# Patient Record
Sex: FEMALE | Race: WHITE | NOT HISPANIC OR LATINO | Employment: PART TIME | URBAN - NONMETROPOLITAN AREA
[De-identification: names, ages, dates, MRNs, and addresses within clinical notes are randomized per-mention and may not be internally consistent; named-entity substitution may affect disease eponyms.]

---

## 2021-03-05 ENCOUNTER — IMMUNIZATION (OUTPATIENT)
Dept: VACCINE CLINIC | Facility: HOSPITAL | Age: 26
End: 2021-03-05

## 2021-03-05 PROCEDURE — 91300 HC SARSCOV02 VAC 30MCG/0.3ML IM: CPT | Performed by: INTERNAL MEDICINE

## 2021-03-05 PROCEDURE — 0001A: CPT | Performed by: INTERNAL MEDICINE

## 2021-03-26 ENCOUNTER — IMMUNIZATION (OUTPATIENT)
Dept: VACCINE CLINIC | Facility: HOSPITAL | Age: 26
End: 2021-03-26

## 2021-03-26 PROCEDURE — 0002A: CPT | Performed by: INTERNAL MEDICINE

## 2021-03-26 PROCEDURE — 91300 HC SARSCOV02 VAC 30MCG/0.3ML IM: CPT | Performed by: INTERNAL MEDICINE

## 2025-02-04 ENCOUNTER — HOSPITAL ENCOUNTER (EMERGENCY)
Facility: HOSPITAL | Age: 30
Discharge: HOME | End: 2025-02-05
Attending: EMERGENCY MEDICINE
Payer: COMMERCIAL

## 2025-02-04 ENCOUNTER — ANESTHESIA (OUTPATIENT)
Dept: PERIOP | Facility: HOSPITAL | Age: 30
End: 2025-02-04
Payer: COMMERCIAL

## 2025-02-04 ENCOUNTER — ANESTHESIA EVENT (OUTPATIENT)
Dept: PERIOP | Facility: HOSPITAL | Age: 30
End: 2025-02-04
Payer: COMMERCIAL

## 2025-02-04 ENCOUNTER — APPOINTMENT (OUTPATIENT)
Dept: CT IMAGING | Facility: HOSPITAL | Age: 30
End: 2025-02-04
Payer: COMMERCIAL

## 2025-02-04 DIAGNOSIS — K37 APPENDICITIS: ICD-10-CM

## 2025-02-04 DIAGNOSIS — K35.30 ACUTE APPENDICITIS WITH LOCALIZED PERITONITIS, WITHOUT PERFORATION, ABSCESS, OR GANGRENE: Primary | ICD-10-CM

## 2025-02-04 PROBLEM — F31.12 BIPOLAR 1 DISORDER, MANIC, MODERATE: Status: ACTIVE | Noted: 2022-03-28

## 2025-02-04 PROBLEM — F41.1 GENERALIZED ANXIETY DISORDER: Status: ACTIVE | Noted: 2022-03-28

## 2025-02-04 LAB
ALBUMIN SERPL-MCNC: 5.2 G/DL (ref 3.5–5.2)
ALBUMIN/GLOB SERPL: 1.7 G/DL
ALP SERPL-CCNC: 72 U/L (ref 39–117)
ALT SERPL W P-5'-P-CCNC: 18 U/L (ref 1–33)
ANION GAP SERPL CALCULATED.3IONS-SCNC: 12.6 MMOL/L (ref 5–15)
AST SERPL-CCNC: 24 U/L (ref 1–32)
B-HCG UR QL: NEGATIVE
BASOPHILS # BLD AUTO: 0.03 10*3/MM3 (ref 0–0.2)
BASOPHILS NFR BLD AUTO: 0.2 % (ref 0–1.5)
BILIRUB SERPL-MCNC: 0.5 MG/DL (ref 0–1.2)
BILIRUB UR QL STRIP: NEGATIVE
BUN SERPL-MCNC: 11 MG/DL (ref 6–20)
BUN/CREAT SERPL: 13.9 (ref 7–25)
CALCIUM SPEC-SCNC: 9.8 MG/DL (ref 8.6–10.5)
CHLORIDE SERPL-SCNC: 101 MMOL/L (ref 98–107)
CLARITY UR: ABNORMAL
CO2 SERPL-SCNC: 25.4 MMOL/L (ref 22–29)
COLOR UR: YELLOW
CREAT SERPL-MCNC: 0.79 MG/DL (ref 0.57–1)
DEPRECATED RDW RBC AUTO: 38.9 FL (ref 37–54)
EGFRCR SERPLBLD CKD-EPI 2021: 105.3 ML/MIN/1.73
EOSINOPHIL # BLD AUTO: 0.05 10*3/MM3 (ref 0–0.4)
EOSINOPHIL NFR BLD AUTO: 0.3 % (ref 0.3–6.2)
ERYTHROCYTE [DISTWIDTH] IN BLOOD BY AUTOMATED COUNT: 11.9 % (ref 12.3–15.4)
FLUAV RNA RESP QL NAA+PROBE: NOT DETECTED
FLUBV RNA RESP QL NAA+PROBE: NOT DETECTED
GLOBULIN UR ELPH-MCNC: 3.1 GM/DL
GLUCOSE SERPL-MCNC: 106 MG/DL (ref 65–99)
GLUCOSE UR STRIP-MCNC: NEGATIVE MG/DL
HCT VFR BLD AUTO: 41.3 % (ref 34–46.6)
HGB BLD-MCNC: 14 G/DL (ref 12–15.9)
HGB UR QL STRIP.AUTO: NEGATIVE
IMM GRANULOCYTES # BLD AUTO: 0.09 10*3/MM3 (ref 0–0.05)
IMM GRANULOCYTES NFR BLD AUTO: 0.5 % (ref 0–0.5)
KETONES UR QL STRIP: ABNORMAL
LEUKOCYTE ESTERASE UR QL STRIP.AUTO: NEGATIVE
LIPASE SERPL-CCNC: 32 U/L (ref 13–60)
LYMPHOCYTES # BLD AUTO: 1.98 10*3/MM3 (ref 0.7–3.1)
LYMPHOCYTES NFR BLD AUTO: 11.4 % (ref 19.6–45.3)
MAGNESIUM SERPL-MCNC: 2 MG/DL (ref 1.6–2.6)
MCH RBC QN AUTO: 30.4 PG (ref 26.6–33)
MCHC RBC AUTO-ENTMCNC: 33.9 G/DL (ref 31.5–35.7)
MCV RBC AUTO: 89.6 FL (ref 79–97)
MONOCYTES # BLD AUTO: 0.78 10*3/MM3 (ref 0.1–0.9)
MONOCYTES NFR BLD AUTO: 4.5 % (ref 5–12)
NEUTROPHILS NFR BLD AUTO: 14.5 10*3/MM3 (ref 1.7–7)
NEUTROPHILS NFR BLD AUTO: 83.1 % (ref 42.7–76)
NITRITE UR QL STRIP: NEGATIVE
NRBC BLD AUTO-RTO: 0 /100 WBC (ref 0–0.2)
PH UR STRIP.AUTO: 6 [PH] (ref 5–8)
PLATELET # BLD AUTO: 279 10*3/MM3 (ref 140–450)
PMV BLD AUTO: 9.8 FL (ref 6–12)
POTASSIUM SERPL-SCNC: 3.9 MMOL/L (ref 3.5–5.2)
PROT SERPL-MCNC: 8.3 G/DL (ref 6–8.5)
PROT UR QL STRIP: NEGATIVE
RBC # BLD AUTO: 4.61 10*6/MM3 (ref 3.77–5.28)
SARS-COV-2 RNA RESP QL NAA+PROBE: NOT DETECTED
SODIUM SERPL-SCNC: 139 MMOL/L (ref 136–145)
SP GR UR STRIP: 1.02 (ref 1–1.03)
UROBILINOGEN UR QL STRIP: ABNORMAL
WBC NRBC COR # BLD AUTO: 17.43 10*3/MM3 (ref 3.4–10.8)

## 2025-02-04 PROCEDURE — 25010000002 SUCCINYLCHOLINE PER 20 MG: Performed by: NURSE ANESTHETIST, CERTIFIED REGISTERED

## 2025-02-04 PROCEDURE — 25010000002 PIPERACILLIN SOD-TAZOBACTAM PER 1 G: Performed by: EMERGENCY MEDICINE

## 2025-02-04 PROCEDURE — 81003 URINALYSIS AUTO W/O SCOPE: CPT | Performed by: EMERGENCY MEDICINE

## 2025-02-04 PROCEDURE — 25010000002 MEPERIDINE PER 100 MG: Performed by: NURSE ANESTHETIST, CERTIFIED REGISTERED

## 2025-02-04 PROCEDURE — 25010000002 HEPARIN (PORCINE) PER 1000 UNITS: Performed by: SURGERY

## 2025-02-04 PROCEDURE — 81025 URINE PREGNANCY TEST: CPT | Performed by: EMERGENCY MEDICINE

## 2025-02-04 PROCEDURE — 87636 SARSCOV2 & INF A&B AMP PRB: CPT | Performed by: EMERGENCY MEDICINE

## 2025-02-04 PROCEDURE — 25010000002 BUPIVACAINE (PF) 0.25 % SOLUTION: Performed by: SURGERY

## 2025-02-04 PROCEDURE — 25010000002 ONDANSETRON PER 1 MG: Performed by: NURSE ANESTHETIST, CERTIFIED REGISTERED

## 2025-02-04 PROCEDURE — 44970 LAPAROSCOPY APPENDECTOMY: CPT | Performed by: SURGERY

## 2025-02-04 PROCEDURE — 83735 ASSAY OF MAGNESIUM: CPT | Performed by: EMERGENCY MEDICINE

## 2025-02-04 PROCEDURE — 99285 EMERGENCY DEPT VISIT HI MDM: CPT | Performed by: EMERGENCY MEDICINE

## 2025-02-04 PROCEDURE — 80053 COMPREHEN METABOLIC PANEL: CPT | Performed by: EMERGENCY MEDICINE

## 2025-02-04 PROCEDURE — 96365 THER/PROPH/DIAG IV INF INIT: CPT

## 2025-02-04 PROCEDURE — 25010000002 DEXAMETHASONE PER 1 MG: Performed by: NURSE ANESTHETIST, CERTIFIED REGISTERED

## 2025-02-04 PROCEDURE — 25010000002 LIDOCAINE 1 % SOLUTION: Performed by: SURGERY

## 2025-02-04 PROCEDURE — 25010000002 MIDAZOLAM PER 1MG: Performed by: NURSE ANESTHETIST, CERTIFIED REGISTERED

## 2025-02-04 PROCEDURE — 74177 CT ABD & PELVIS W/CONTRAST: CPT

## 2025-02-04 PROCEDURE — 83690 ASSAY OF LIPASE: CPT | Performed by: EMERGENCY MEDICINE

## 2025-02-04 PROCEDURE — 25010000002 KETOROLAC TROMETHAMINE PER 15 MG: Performed by: NURSE ANESTHETIST, CERTIFIED REGISTERED

## 2025-02-04 PROCEDURE — 99204 OFFICE O/P NEW MOD 45 MIN: CPT | Performed by: SURGERY

## 2025-02-04 PROCEDURE — 85025 COMPLETE CBC W/AUTO DIFF WBC: CPT | Performed by: EMERGENCY MEDICINE

## 2025-02-04 PROCEDURE — 25010000002 FENTANYL CITRATE (PF) 50 MCG/ML SOLUTION PREFILLED SYRINGE: Performed by: NURSE ANESTHETIST, CERTIFIED REGISTERED

## 2025-02-04 PROCEDURE — 25010000002 PROPOFOL 10 MG/ML EMULSION: Performed by: NURSE ANESTHETIST, CERTIFIED REGISTERED

## 2025-02-04 PROCEDURE — 25010000002 SUGAMMADEX 200 MG/2ML SOLUTION: Performed by: NURSE ANESTHETIST, CERTIFIED REGISTERED

## 2025-02-04 PROCEDURE — 25810000003 LACTATED RINGERS PER 1000 ML: Performed by: EMERGENCY MEDICINE

## 2025-02-04 PROCEDURE — 25510000001 IOPAMIDOL 61 % SOLUTION: Performed by: EMERGENCY MEDICINE

## 2025-02-04 DEVICE — THE ECHELON, ECHELON ENDOPATH™ AND ECHELON FLEX™ FAMILIES OF ENDOSCOPIC LINEAR CUTTERS AND RELOADS ARE STERILE, SINGLE PATIENT USE INSTRUMENTS THAT SIMULTANEOUSLY CUT AND STAPLE TISSUE. THERE ARE SIX STAGGERED ROWS OF STAPLES, THREE ON EITHER SIDE OF THE CUT LINE. THE 45 MM INSTRUMENTS HAVE A STAPLE LINE THATIS APPROXIMATELY 45 MM LONG AND A CUT LINE THAT IS APPROXIMATELY 42 MM LONG. THE SHAFT CAN ROTATE FREELY IN BOTH DIRECTIONS AND AN ARTICULATION MECHANISM ON ARTICULATING INSTRUMENTS ENABLES BENDING THE DISTAL PORTIONOF THE SHAFT TO FACILITATE LATERAL ACCESS OF THE OPERATIVE SITE.THE INSTRUMENTS ARE SHIPPED WITHOUT A RELOAD AND MUST BE LOADED PRIOR TO USE. A STAPLE RETAINING CAP ON THE RELOAD PROTECTS THE STAPLE LEG POINTS DURING SHIPPING AND TRANSPORTATION. THE INSTRUMENTS’ LOCK-OUT FEATURE IS DESIGNED TO PREVENT A USED RELOAD FROM BEING REFIRED.
Type: IMPLANTABLE DEVICE | Site: ABDOMEN | Status: FUNCTIONAL
Brand: ECHELON ENDOPATH

## 2025-02-04 DEVICE — LIGACLIP 10-M/L, 10MM ENDOSCOPIC ROTATING MULTIPLE CLIP APPLIERS
Type: IMPLANTABLE DEVICE | Site: ABDOMEN | Status: FUNCTIONAL
Brand: LIGACLIP

## 2025-02-04 RX ORDER — DEXAMETHASONE SODIUM PHOSPHATE 4 MG/ML
INJECTION, SOLUTION INTRA-ARTICULAR; INTRALESIONAL; INTRAMUSCULAR; INTRAVENOUS; SOFT TISSUE AS NEEDED
Status: DISCONTINUED | OUTPATIENT
Start: 2025-02-04 | End: 2025-02-04 | Stop reason: SURG

## 2025-02-04 RX ORDER — LORAZEPAM 2 MG/ML
1 INJECTION INTRAMUSCULAR
Status: DISCONTINUED | OUTPATIENT
Start: 2025-02-04 | End: 2025-02-05 | Stop reason: HOSPADM

## 2025-02-04 RX ORDER — SODIUM CHLORIDE 9 MG/ML
40 INJECTION, SOLUTION INTRAVENOUS AS NEEDED
Status: DISCONTINUED | OUTPATIENT
Start: 2025-02-04 | End: 2025-02-04 | Stop reason: HOSPADM

## 2025-02-04 RX ORDER — MIDAZOLAM HYDROCHLORIDE 2 MG/2ML
INJECTION, SOLUTION INTRAMUSCULAR; INTRAVENOUS AS NEEDED
Status: DISCONTINUED | OUTPATIENT
Start: 2025-02-04 | End: 2025-02-04 | Stop reason: SURG

## 2025-02-04 RX ORDER — ACETAMINOPHEN 500 MG
1000 TABLET ORAL EVERY 6 HOURS PRN
COMMUNITY
Start: 2025-02-04

## 2025-02-04 RX ORDER — SODIUM CHLORIDE 0.9 % (FLUSH) 0.9 %
10 SYRINGE (ML) INJECTION AS NEEDED
Status: DISCONTINUED | OUTPATIENT
Start: 2025-02-04 | End: 2025-02-05 | Stop reason: HOSPADM

## 2025-02-04 RX ORDER — SUCCINYLCHOLINE CHLORIDE 20 MG/ML
INJECTION INTRAMUSCULAR; INTRAVENOUS AS NEEDED
Status: DISCONTINUED | OUTPATIENT
Start: 2025-02-04 | End: 2025-02-04 | Stop reason: SURG

## 2025-02-04 RX ORDER — SODIUM CHLORIDE 0.9 % (FLUSH) 0.9 %
10 SYRINGE (ML) INJECTION EVERY 12 HOURS SCHEDULED
Status: DISCONTINUED | OUTPATIENT
Start: 2025-02-04 | End: 2025-02-04 | Stop reason: HOSPADM

## 2025-02-04 RX ORDER — LIDOCAINE HCL/PF 100 MG/5ML
SYRINGE (ML) INJECTION AS NEEDED
Status: DISCONTINUED | OUTPATIENT
Start: 2025-02-04 | End: 2025-02-04 | Stop reason: SURG

## 2025-02-04 RX ORDER — ONDANSETRON 2 MG/ML
4 INJECTION INTRAMUSCULAR; INTRAVENOUS ONCE AS NEEDED
Status: DISCONTINUED | OUTPATIENT
Start: 2025-02-04 | End: 2025-02-05 | Stop reason: HOSPADM

## 2025-02-04 RX ORDER — HEPARIN SODIUM 5000 [USP'U]/ML
5000 INJECTION, SOLUTION INTRAVENOUS; SUBCUTANEOUS ONCE
Status: DISCONTINUED | OUTPATIENT
Start: 2025-02-04 | End: 2025-02-04 | Stop reason: HOSPADM

## 2025-02-04 RX ORDER — ONDANSETRON 2 MG/ML
INJECTION INTRAMUSCULAR; INTRAVENOUS AS NEEDED
Status: DISCONTINUED | OUTPATIENT
Start: 2025-02-04 | End: 2025-02-04 | Stop reason: SURG

## 2025-02-04 RX ORDER — KETOROLAC TROMETHAMINE 30 MG/ML
INJECTION, SOLUTION INTRAMUSCULAR; INTRAVENOUS AS NEEDED
Status: DISCONTINUED | OUTPATIENT
Start: 2025-02-04 | End: 2025-02-04 | Stop reason: SURG

## 2025-02-04 RX ORDER — ONDANSETRON 2 MG/ML
4 INJECTION INTRAMUSCULAR; INTRAVENOUS ONCE AS NEEDED
Status: COMPLETED | OUTPATIENT
Start: 2025-02-04 | End: 2025-02-05

## 2025-02-04 RX ORDER — BUPIVACAINE HYDROCHLORIDE 2.5 MG/ML
INJECTION, SOLUTION EPIDURAL; INFILTRATION; INTRACAUDAL AS NEEDED
Status: DISCONTINUED | OUTPATIENT
Start: 2025-02-04 | End: 2025-02-04 | Stop reason: HOSPADM

## 2025-02-04 RX ORDER — FENTANYL CITRATE 50 UG/ML
INJECTION, SOLUTION INTRAMUSCULAR; INTRAVENOUS AS NEEDED
Status: DISCONTINUED | OUTPATIENT
Start: 2025-02-04 | End: 2025-02-04 | Stop reason: SURG

## 2025-02-04 RX ORDER — SODIUM CHLORIDE, SODIUM LACTATE, POTASSIUM CHLORIDE, CALCIUM CHLORIDE 600; 310; 30; 20 MG/100ML; MG/100ML; MG/100ML; MG/100ML
75 INJECTION, SOLUTION INTRAVENOUS CONTINUOUS
Status: DISCONTINUED | OUTPATIENT
Start: 2025-02-04 | End: 2025-02-05 | Stop reason: HOSPADM

## 2025-02-04 RX ORDER — LIDOCAINE HYDROCHLORIDE 10 MG/ML
INJECTION, SOLUTION INFILTRATION; PERINEURAL AS NEEDED
Status: DISCONTINUED | OUTPATIENT
Start: 2025-02-04 | End: 2025-02-04 | Stop reason: HOSPADM

## 2025-02-04 RX ORDER — IOPAMIDOL 612 MG/ML
100 INJECTION, SOLUTION INTRAVASCULAR
Status: COMPLETED | OUTPATIENT
Start: 2025-02-04 | End: 2025-02-04

## 2025-02-04 RX ORDER — SODIUM CHLORIDE 0.9 % (FLUSH) 0.9 %
10 SYRINGE (ML) INJECTION AS NEEDED
Status: DISCONTINUED | OUTPATIENT
Start: 2025-02-04 | End: 2025-02-04 | Stop reason: HOSPADM

## 2025-02-04 RX ORDER — ROCURONIUM BROMIDE 10 MG/ML
INJECTION, SOLUTION INTRAVENOUS AS NEEDED
Status: DISCONTINUED | OUTPATIENT
Start: 2025-02-04 | End: 2025-02-04 | Stop reason: SURG

## 2025-02-04 RX ORDER — PROCHLORPERAZINE EDISYLATE 5 MG/ML
10 INJECTION INTRAMUSCULAR; INTRAVENOUS ONCE AS NEEDED
Status: COMPLETED | OUTPATIENT
Start: 2025-02-04 | End: 2025-02-05

## 2025-02-04 RX ORDER — IBUPROFEN 200 MG
600 TABLET ORAL EVERY 6 HOURS PRN
COMMUNITY
Start: 2025-02-04 | End: 2026-02-04

## 2025-02-04 RX ORDER — PROPOFOL 10 MG/ML
VIAL (ML) INTRAVENOUS AS NEEDED
Status: DISCONTINUED | OUTPATIENT
Start: 2025-02-04 | End: 2025-02-04 | Stop reason: SURG

## 2025-02-04 RX ORDER — MEPERIDINE HYDROCHLORIDE 25 MG/ML
12.5 INJECTION INTRAMUSCULAR; INTRAVENOUS; SUBCUTANEOUS
Status: DISCONTINUED | OUTPATIENT
Start: 2025-02-04 | End: 2025-02-05 | Stop reason: HOSPADM

## 2025-02-04 RX ADMIN — MIDAZOLAM HYDROCHLORIDE 2 MG: 1 INJECTION, SOLUTION INTRAMUSCULAR; INTRAVENOUS at 22:18

## 2025-02-04 RX ADMIN — DEXAMETHASONE SODIUM PHOSPHATE 4 MG: 4 INJECTION, SOLUTION INTRA-ARTICULAR; INTRALESIONAL; INTRAMUSCULAR; INTRAVENOUS; SOFT TISSUE at 22:27

## 2025-02-04 RX ADMIN — PIPERACILLIN AND TAZOBACTAM 3.38 G: 3; .375 INJECTION, POWDER, LYOPHILIZED, FOR SOLUTION INTRAVENOUS; PARENTERAL at 21:27

## 2025-02-04 RX ADMIN — Medication 60 MG: at 22:23

## 2025-02-04 RX ADMIN — IOPAMIDOL 100 ML: 612 INJECTION, SOLUTION INTRAVENOUS at 20:21

## 2025-02-04 RX ADMIN — SUCCINYLCHOLINE CHLORIDE 140 MG: 20 INJECTION, SOLUTION INTRAMUSCULAR; INTRAVENOUS at 22:23

## 2025-02-04 RX ADMIN — ONDANSETRON 4 MG: 2 INJECTION INTRAMUSCULAR; INTRAVENOUS at 22:18

## 2025-02-04 RX ADMIN — KETOROLAC TROMETHAMINE 30 MG: 30 INJECTION, SOLUTION INTRAMUSCULAR; INTRAVENOUS at 23:30

## 2025-02-04 RX ADMIN — ROCURONIUM BROMIDE 30 MG: 10 INJECTION, SOLUTION INTRAVENOUS at 22:37

## 2025-02-04 RX ADMIN — ROCURONIUM BROMIDE 5 MG: 10 INJECTION, SOLUTION INTRAVENOUS at 22:23

## 2025-02-04 RX ADMIN — MEPERIDINE HYDROCHLORIDE 12.5 MG: 25 INJECTION INTRAMUSCULAR; INTRAVENOUS; SUBCUTANEOUS at 23:55

## 2025-02-04 RX ADMIN — SUGAMMADEX 200 MG: 100 INJECTION, SOLUTION INTRAVENOUS at 23:37

## 2025-02-04 RX ADMIN — SODIUM CHLORIDE, POTASSIUM CHLORIDE, SODIUM LACTATE AND CALCIUM CHLORIDE 75 ML/HR: 600; 310; 30; 20 INJECTION, SOLUTION INTRAVENOUS at 21:26

## 2025-02-04 RX ADMIN — PROPOFOL 170 MG: 10 INJECTION, EMULSION INTRAVENOUS at 22:23

## 2025-02-04 RX ADMIN — FENTANYL CITRATE 100 MCG: 50 INJECTION, SOLUTION INTRAMUSCULAR; INTRAVENOUS at 22:23

## 2025-02-05 VITALS
HEART RATE: 81 BPM | HEIGHT: 62 IN | SYSTOLIC BLOOD PRESSURE: 97 MMHG | DIASTOLIC BLOOD PRESSURE: 58 MMHG | RESPIRATION RATE: 18 BRPM | WEIGHT: 130 LBS | TEMPERATURE: 97.7 F | BODY MASS INDEX: 23.92 KG/M2 | OXYGEN SATURATION: 97 %

## 2025-02-05 PROCEDURE — 25010000002 PROCHLORPERAZINE 10 MG/2ML SOLUTION: Performed by: NURSE ANESTHETIST, CERTIFIED REGISTERED

## 2025-02-05 PROCEDURE — 25010000002 ONDANSETRON PER 1 MG: Performed by: NURSE ANESTHETIST, CERTIFIED REGISTERED

## 2025-02-05 RX ADMIN — ONDANSETRON 4 MG: 2 INJECTION INTRAMUSCULAR; INTRAVENOUS at 00:12

## 2025-02-05 RX ADMIN — PROCHLORPERAZINE EDISYLATE 10 MG: 5 INJECTION INTRAMUSCULAR; INTRAVENOUS at 00:48

## 2025-02-05 NOTE — ANESTHESIA PROCEDURE NOTES
Airway  Urgency: elective    Date/Time: 2/4/2025 10:24 PM  Airway not difficult    General Information and Staff    Patient location during procedure: OR  CRNA/CAA: Torin Escobedo CRNA    Indications and Patient Condition  Indications for airway management: airway protection    Preoxygenated: yes  MILS not maintained throughout  Mask difficulty assessment: 1 - vent by mask    Final Airway Details  Final airway type: endotracheal airway      Successful airway: ETT  Cuffed: yes   Successful intubation technique: direct laryngoscopy  Endotracheal tube insertion site: oral  Blade: Jacqueline  Blade size: 3  ETT size (mm): 7.0  Cormack-Lehane Classification: grade I - full view of glottis  Placement verified by: chest auscultation and capnometry   Cuff volume (mL): 7  Measured from: lips  ETT/EBT  to lips (cm): 21  Number of attempts at approach: 1  Assessment: lips, teeth, and gum same as pre-op and atraumatic intubation    Additional Comments  Negative epigastric sounds, Breath sound equal bilaterally with symmetric chest rise and fall

## 2025-02-05 NOTE — ED PROVIDER NOTES
EMERGENCY DEPARTMENT ENCOUNTER    Pt Name: Rose Harmon  MRN: [id number]  Pt :   1995  Room Number:    Date of encounter:  2025  PCP: Tess Doll MD  ED Provider: Nathan Dooley MD    Historian: Patient      HPI:  Chief Complaint   Patient presents with    Abdominal Pain          Context: Rose Harmon is a 29 y.o. female who presents to the ED c/o abdominal pain, located right lower quadrant, started earlier today, wraps around to the back somewhat, no vomiting but has had nausea.  Denies any history of abdominal surgeries.  Last menstrual cycle was approximately 2 weeks ago was normal.  She denies diarrhea, blood in her stool.  No fevers.      PAST MEDICAL HISTORY  History reviewed. No pertinent past medical history.      PAST SURGICAL HISTORY  Past Surgical History:   Procedure Laterality Date    TONSILLECTOMY           FAMILY HISTORY  History reviewed. No pertinent family history.      SOCIAL HISTORY  Social History     Socioeconomic History    Marital status: Single         ALLERGIES  Patient has no known allergies.        REVIEW OF SYSTEMS  Review of Systems   Constitutional:  Negative for chills and fever.   HENT:  Negative for sore throat and trouble swallowing.    Eyes:  Negative for pain and redness.   Respiratory:  Negative for cough and shortness of breath.    Cardiovascular:  Negative for chest pain and leg swelling.   Gastrointestinal:  Positive for abdominal pain and nausea. Negative for vomiting.   Genitourinary:  Negative for dysuria and urgency.   Musculoskeletal:  Negative for back pain and neck pain.   Skin:  Negative for rash and wound.   Neurological:  Negative for dizziness and weakness.        All systems reviewed and negative except for those discussed in HPI.       PHYSICAL EXAM    I have reviewed the triage vital signs and nursing notes.    ED Triage Vitals [25 1908]   Temp Heart Rate Resp BP SpO2   98 °F (36.7 °C) 119 18 135/89 96 %      Temp src  Heart Rate Source Patient Position BP Location FiO2 (%)   Oral Monitor Sitting -- --       Physical Exam  Constitutional:       Appearance: Normal appearance. She is not ill-appearing.   HENT:      Head: Normocephalic and atraumatic.      Right Ear: External ear normal.      Left Ear: External ear normal.      Nose: Nose normal.      Mouth/Throat:      Mouth: Mucous membranes are moist.      Pharynx: Oropharynx is clear.   Eyes:      Extraocular Movements: Extraocular movements intact.      Conjunctiva/sclera: Conjunctivae normal.      Pupils: Pupils are equal, round, and reactive to light.   Cardiovascular:      Rate and Rhythm: Normal rate and regular rhythm.      Pulses:           Radial pulses are 2+ on the right side and 2+ on the left side.   Pulmonary:      Effort: Pulmonary effort is normal.      Breath sounds: Normal breath sounds.   Abdominal:      General: There is no distension.      Palpations: Abdomen is soft.      Tenderness: There is abdominal tenderness in the right lower quadrant. There is guarding.   Musculoskeletal:         General: No tenderness or deformity. Normal range of motion.      Cervical back: Normal range of motion and neck supple.      Right lower leg: No edema.      Left lower leg: No edema.   Skin:     General: Skin is warm and dry.      Capillary Refill: Capillary refill takes less than 2 seconds.   Neurological:      General: No focal deficit present.      Mental Status: She is alert and oriented to person, place, and time.            LAB RESULTS  Recent Results (from the past 24 hours)   Comprehensive Metabolic Panel    Collection Time: 02/04/25  7:32 PM    Specimen: Blood   Result Value Ref Range    Glucose 106 (H) 65 - 99 mg/dL    BUN 11 6 - 20 mg/dL    Creatinine 0.79 0.57 - 1.00 mg/dL    Sodium 139 136 - 145 mmol/L    Potassium 3.9 3.5 - 5.2 mmol/L    Chloride 101 98 - 107 mmol/L    CO2 25.4 22.0 - 29.0 mmol/L    Calcium 9.8 8.6 - 10.5 mg/dL    Total Protein 8.3 6.0 - 8.5  g/dL    Albumin 5.2 3.5 - 5.2 g/dL    ALT (SGPT) 18 1 - 33 U/L    AST (SGOT) 24 1 - 32 U/L    Alkaline Phosphatase 72 39 - 117 U/L    Total Bilirubin 0.5 0.0 - 1.2 mg/dL    Globulin 3.1 gm/dL    A/G Ratio 1.7 g/dL    BUN/Creatinine Ratio 13.9 7.0 - 25.0    Anion Gap 12.6 5.0 - 15.0 mmol/L    eGFR 105.3 >60.0 mL/min/1.73   Magnesium    Collection Time: 02/04/25  7:32 PM    Specimen: Blood   Result Value Ref Range    Magnesium 2.0 1.6 - 2.6 mg/dL   Lipase    Collection Time: 02/04/25  7:32 PM    Specimen: Blood   Result Value Ref Range    Lipase 32 13 - 60 U/L   CBC Auto Differential    Collection Time: 02/04/25  7:32 PM    Specimen: Blood   Result Value Ref Range    WBC 17.43 (H) 3.40 - 10.80 10*3/mm3    RBC 4.61 3.77 - 5.28 10*6/mm3    Hemoglobin 14.0 12.0 - 15.9 g/dL    Hematocrit 41.3 34.0 - 46.6 %    MCV 89.6 79.0 - 97.0 fL    MCH 30.4 26.6 - 33.0 pg    MCHC 33.9 31.5 - 35.7 g/dL    RDW 11.9 (L) 12.3 - 15.4 %    RDW-SD 38.9 37.0 - 54.0 fl    MPV 9.8 6.0 - 12.0 fL    Platelets 279 140 - 450 10*3/mm3    Neutrophil % 83.1 (H) 42.7 - 76.0 %    Lymphocyte % 11.4 (L) 19.6 - 45.3 %    Monocyte % 4.5 (L) 5.0 - 12.0 %    Eosinophil % 0.3 0.3 - 6.2 %    Basophil % 0.2 0.0 - 1.5 %    Immature Grans % 0.5 0.0 - 0.5 %    Neutrophils, Absolute 14.50 (H) 1.70 - 7.00 10*3/mm3    Lymphocytes, Absolute 1.98 0.70 - 3.10 10*3/mm3    Monocytes, Absolute 0.78 0.10 - 0.90 10*3/mm3    Eosinophils, Absolute 0.05 0.00 - 0.40 10*3/mm3    Basophils, Absolute 0.03 0.00 - 0.20 10*3/mm3    Immature Grans, Absolute 0.09 (H) 0.00 - 0.05 10*3/mm3    nRBC 0.0 0.0 - 0.2 /100 WBC   COVID-19 and FLU A/B PCR, 1 HR TAT - Swab, Nasopharynx    Collection Time: 02/04/25  7:32 PM    Specimen: Nasopharynx; Swab   Result Value Ref Range    COVID19 Not Detected Not Detected - Ref. Range    Influenza A PCR Not Detected Not Detected    Influenza B PCR Not Detected Not Detected   Urinalysis With Microscopic If Indicated (No Culture) - Urine, Clean Catch     Collection Time: 02/04/25  7:40 PM    Specimen: Urine, Clean Catch   Result Value Ref Range    Color, UA Yellow Yellow, Straw    Appearance, UA Cloudy (A) Clear    pH, UA 6.0 5.0 - 8.0    Specific Gravity, UA 1.017 1.005 - 1.030    Glucose, UA Negative Negative    Ketones, UA 80 mg/dL (3+) (A) Negative    Bilirubin, UA Negative Negative    Blood, UA Negative Negative    Protein, UA Negative Negative    Leuk Esterase, UA Negative Negative    Nitrite, UA Negative Negative    Urobilinogen, UA 1.0 E.U./dL 0.2 - 1.0 E.U./dL   Pregnancy, Urine - Urine, Clean Catch    Collection Time: 02/04/25  7:40 PM    Specimen: Urine, Clean Catch   Result Value Ref Range    HCG, Urine QL Negative Negative       If labs were ordered, I independently reviewed the results and considered them in treating the patient.        RADIOLOGY  CT Abdomen Pelvis With Contrast    Addendum Date: 2/4/2025    ADDENDUM REPORT ADDENDUM: This report was discussed with Fela Glaser RN on Feb 04, 2025 21:08:00 EST. Authenticated and Electronically Signed by Demetrius Hinkle MD on 02/04/2025 09:08:43 PM    Result Date: 2/4/2025  FINAL REPORT TECHNIQUE: null CLINICAL HISTORY: RLQ pain COMPARISON: null FINDINGS: CT abdomen and pelvis with contrast Comparison: None Findings: The appendix normal in caliber at 9.5 mm. However, there is borderline wall thickening at 3 to 4 mm. Minimal stranding medial to the appendiceal origin. Findings may represent early or mild acute appendicitis Lung bases are clear. No acute bony abnormalities. Liver and spleen within normal limits. Pancreas and adrenal glands unremarkable. Gallbladder is within normal limits. No significant focal renal abnormalities. Renal cysts, no stones or hydronephrosis. Abdominal aorta is normal in caliber. No free fluid or adenopathy in the pelvis. No diverticulitis. Uterus normal size. No adnexal abnormality.     Impression: Borderline abnormal appendix as above Question developing early or mild  appendicitis Authenticated and Electronically Signed by Demetrius Hinkle MD on 02/04/2025 09:05:37 PM         PROCEDURES    Procedures    Interpretations    O2 Sat: The patient's oxygen saturation was 98% on Room Air.  This was independently interpreted by me as Normal      Radiology: I ordered and independently reviewed the above noted radiographic studies.  I viewed images of CT Abdomen/Pelvis which showed  appendicitis  per my independent interpretation. See radiologist's dictation for official interpretation.         MEDICATIONS GIVEN IN ER    Medications   sodium chloride 0.9 % flush 10 mL (has no administration in time range)   piperacillin-tazobactam (ZOSYN) IVPB 3.375 g IVPB in 100 mL NS (VTB) (has no administration in time range)   lactated ringers infusion (has no administration in time range)   iopamidol (ISOVUE-300) 61 % injection 100 mL (100 mL Intravenous Given 2/4/25 2021)         MEDICAL DECISION MAKING, PROGRESS, and CONSULTS    All labs, if obtained, have been independently reviewed by me.  All radiology studies, if obtained, have been reviewed by me and the radiologist dictating the report.  All EKG's, if obtained, have been independently viewed and interpreted by me      Discussion below represents my analysis of pertinent findings related to patient's condition, differential diagnosis, treatment plan and final disposition.      Differential diagnosis:    29-year-old female presented ED with right lower quadrant pain, she is fairly tender in the right lower quadrant, has guarding, concerning for appendicitis, versus ectopic pregnancy, less likely diverticulitis or bowel obstruction.  Will obtain CT scan of the abdomen pelvis, laboratory workup, offered patient IV pain medication and she declined    Additional Sources:  External (non-ED) record review:  None available for review       Orders placed during this visit:  Orders Placed This Encounter   Procedures    COVID-19 and FLU A/B PCR, 1 HR TAT -  Swab, Nasopharynx    CT Abdomen Pelvis With Contrast    Comprehensive Metabolic Panel    Urinalysis With Microscopic If Indicated (No Culture) - Urine, Clean Catch    Pregnancy, Urine - Urine, Clean Catch    Magnesium    Lipase    CBC Auto Differential    NPO Diet NPO Type: Strict NPO    Insert Peripheral IV    CBC & Differential         Additional orders considered but not ordered:  None    ED Course:    Consultants:   General Surgery Dr. Donovan    ED Course as of 02/04/25 2112   Tue Feb 04, 2025 2006 Pregnancy, Urine - Urine, Clean Catch  Urine pregnancy is negative, ruling out acute ectopic pregnancy [CS]   2006 CBC & Differential(!)  CBC with significant elevated white blood cell count [CS]   2006 Comprehensive Metabolic Panel(!)  CMP is benign including normal LFTs [CS]   2112 CT scan shows appendicitis, spoke directly to Dr. Donovan from general surgery who agrees to take the patient to the operating room for definitive surgical management.  Advised the patient of this finding and the need for surgery and she was agreeable [CS]      ED Course User Index  [CS] Nathan Dooley MD               AS OF 21:12 EST VITALS:    BP - 110/96  HR - 119  TEMP - 98 °F (36.7 °C) (Oral)  O2 SATS - 98%    I reviewed the patient's prescription monitoring report if available prior to discharge    DIAGNOSIS  Final diagnoses:   Acute appendicitis with localized peritonitis, without perforation, abscess, or gangrene         DISPOSITION  ED Disposition       ED Disposition   Send to OR    Condition   --    Comment   --                   Please note that portions of this document were completed with voice recognition software.        Nathan Dooley MD  02/04/25 2114

## 2025-02-05 NOTE — ANESTHESIA PREPROCEDURE EVALUATION
Anesthesia Evaluation     Patient summary reviewed, Nursing notes reviewed and pregnancy test completed   NPO Solid Status: > 8 hours  NPO Liquid Status: > 8 hours           Airway   Mallampati: I  TM distance: >3 FB  Neck ROM: full  No difficulty expected  Dental - normal exam     Pulmonary - negative pulmonary ROS and normal exam   (-) not a smoker  Cardiovascular - negative cardio ROS and normal exam  Exercise tolerance: good (4-7 METS)        Neuro/Psych  (+) psychiatric history Anxiety and Bipolar  GI/Hepatic/Renal/Endo - negative ROS     Musculoskeletal (-) negative ROS    Abdominal    Substance History - negative use     OB/GYN negative ob/gyn ROS         Other        ROS/Med Hx Other: Hcg - neg                Anesthesia Plan    ASA 1 - emergent     general     (Risks and benefits discussed including risk of aspiration, recall and dental damage. All patient questions answered.    Will continue with plan of care.)  intravenous induction     Anesthetic plan, risks, benefits, and alternatives have been provided, discussed and informed consent has been obtained with: patient.  Pre-procedure education provided    CODE STATUS:

## 2025-02-05 NOTE — CONSULTS
General Surgery Consult     Name:Rose Harmon  Age: 29 y.o.  Gender: female  : 1995  MRN: [id number]  Visit Number: [id number]  Admit Date: 2025  Date of Service: 25    Patient Care Team:  Tess Doll MD as PCP - General (Family Medicine)    Reason for Consultation: Acute appendicitis    Chief complaint : abdominal pain, nausea, dry heaves      History of Present Illness:     Rose Harmon is a 29 y.o. female patient who presented to the emergency department this evening complaining of a several hour history of the acute onset of lower abdominal pain with associated nausea, dry heaves, and chills.  She reports that when she awoke this morning she was in her usual state of health.  She denies fevers.  She denies vomiting, or diarrhea.  She denies any urinary symptoms.  In the emergency department, She was noted to be afebrile, but was tachycardic to 119.    Labs revealed an elevated white blood cell count of 17.43 with a noted left shift at 83% neutrophils.  Comprehensive metabolic panel was unremarkable.  Urinalysis demonstrated 3+ ketones.  Urin pregnancy test was negative.  She went on to have a CT scan of the abdomen pelvis performed with the benefit of IV contrast.  This demonstratede wall thickening of an otherwise normal caliber appendix with mild stranding adjacent to the appendix, likely representing early acute appendicitis.  No other intra-abdominal pathology was identified.  Based on these findings, I was contacted by the emergency department provider, and asked to evaluate the patient in consultation.    Patient Active Problem List   Diagnosis    Bipolar 1 disorder, manic, moderate    Generalized anxiety disorder    Acute appendicitis with localized peritonitis, without perforation, abscess, or gangrene       History reviewed. No pertinent past medical history.    Past Surgical History:   Procedure Laterality Date    TONSILLECTOMY         Family History   Problem Relation Age  of Onset    Diabetes type II Mother     Gallbladder disease Father     Diabetes type II Maternal Grandmother        Social History     Socioeconomic History    Marital status: Single   Tobacco Use    Smoking status: Never     Passive exposure: Never    Smokeless tobacco: Never   Substance and Sexual Activity    Alcohol use: Never    Drug use: Never    Sexual activity: Defer         Current Facility-Administered Medications:     heparin (porcine) 5000 UNIT/ML injection 5,000 Units, 5,000 Units, Subcutaneous, Once, Noemí Donovan MD    lactated ringers infusion, 75 mL/hr, Intravenous, Continuous, Nathan Dooley MD, Last Rate: 75 mL/hr at 02/04/25 2126, 75 mL/hr at 02/04/25 2126    piperacillin-tazobactam (ZOSYN) IVPB 3.375 g IVPB in 100 mL NS (VTB), 3.375 g, Intravenous, Once, Nathan Dooley MD, 3.375 g at 02/04/25 2127    [COMPLETED] Insert Peripheral IV, , , Once **AND** sodium chloride 0.9 % flush 10 mL, 10 mL, Intravenous, PRN, Nathan Dooley MD    sodium chloride 0.9 % flush 10 mL, 10 mL, Intravenous, Q12H, Noemí Donovan MD    sodium chloride 0.9 % flush 10 mL, 10 mL, Intravenous, PRN, Noemí Donovan MD    sodium chloride 0.9 % infusion 40 mL, 40 mL, Intravenous, PRN, Noemí Donovan MD  No current outpatient medications on file.    (Not in a hospital admission)      No Known Allergies    Review of Systems   Constitutional:  Positive for chills.   HENT: Negative.     Eyes: Negative.    Respiratory: Negative.     Cardiovascular: Negative.    Gastrointestinal:  Positive for abdominal distention, abdominal pain and nausea.   Endocrine: Negative.    Genitourinary: Negative.    Musculoskeletal: Negative.    Skin: Negative.    Allergic/Immunologic: Negative.    Neurological: Negative.    Hematological: Negative.    Psychiatric/Behavioral: Negative.         OBJECTIVE:     Vital Signs  Temp:  [98 °F (36.7 °C)] 98 °F (36.7 °C)  Heart Rate:  [119] 119  Resp:  [18] 18  BP:  ()/(73-96) 93/75    No intake/output data recorded.  No intake/output data recorded.      Physical Exam:      General Appearance:    Alert, cooperative, in no acute distress   Head:    Normocephalic, without obvious abnormality, atraumatic   Eyes:            Lids and lashes normal, conjunctivae and sclerae normal, no icterus   Ears:    Ears appear intact with no abnormalities noted   Lungs:     Respirations regular, even and unlabored    Heart:    Regular rhythm and normal rate   Abdomen:     Soft, non-tender, non-distended, no guarding, no rebound   tenderness   Genitalia:    Deferred   Extremities:   Moves all extremities well, no edema, no cyanosis, no  redness   Pulses:   Pulses palpable and equal bilaterally   Skin:   No bleeding, bruising or rash   Neurologic: Awake and alert, no facial droop, no gross deficits         Results Review:   I have reviewed the entirety of the patient's clinical lab results.  I have also personally reviewed the patient's imaging    Narrative & Impression   FINAL REPORT     TECHNIQUE:  null     CLINICAL HISTORY:  RLQ pain     COMPARISON:  null     FINDINGS:  CT abdomen and pelvis with contrast     Comparison: None     Findings:     The appendix normal in caliber at 9.5 mm.     However, there is borderline wall thickening at 3 to 4 mm.     Minimal stranding medial to the appendiceal origin.     Findings may represent early or mild acute appendicitis     Lung bases are clear. No acute bony abnormalities.     Liver and spleen within normal limits.     Pancreas and adrenal glands unremarkable.     Gallbladder is within normal limits.     No significant focal renal abnormalities.     Renal cysts, no stones or hydronephrosis.     Abdominal aorta is normal in caliber.     No free fluid or adenopathy in the pelvis.     No diverticulitis.     Uterus normal size. No adnexal abnormality.     IMPRESSION:  Impression:     Borderline abnormal appendix as above     Question developing early  or mild appendicitis     Authenticated and Electronically Signed by Demetrius Hinkle MD on  02/04/2025 09:05:37 PM     Lab Results (last 72 hours)       Procedure Component Value Units Date/Time    COVID-19 and FLU A/B PCR, 1 HR TAT - Swab, Nasopharynx [[id number]]  (Normal) Collected: 02/04/25 1932    Specimen: Swab from Nasopharynx Updated: 02/04/25 2012     COVID19 Not Detected     Influenza A PCR Not Detected     Influenza B PCR Not Detected    Narrative:      Fact sheet for providers: https://www.fda.gov/media/462609/download    Fact sheet for patients: https://www.fda.gov/media/864967/download    Test performed by PCR.    Comprehensive Metabolic Panel [[id number]]  (Abnormal) Collected: 02/04/25 1932    Specimen: Blood Updated: 02/04/25 1955     Glucose 106 mg/dL      BUN 11 mg/dL      Creatinine 0.79 mg/dL      Sodium 139 mmol/L      Potassium 3.9 mmol/L      Chloride 101 mmol/L      CO2 25.4 mmol/L      Calcium 9.8 mg/dL      Total Protein 8.3 g/dL      Albumin 5.2 g/dL      ALT (SGPT) 18 U/L      AST (SGOT) 24 U/L      Alkaline Phosphatase 72 U/L      Total Bilirubin 0.5 mg/dL      Globulin 3.1 gm/dL      A/G Ratio 1.7 g/dL      BUN/Creatinine Ratio 13.9     Anion Gap 12.6 mmol/L      eGFR 105.3 mL/min/1.73     Narrative:      GFR Categories in Chronic Kidney Disease (CKD)      GFR Category          GFR (mL/min/1.73)    Interpretation  G1                     90 or greater         Normal or high (1)  G2                      60-89                Mild decrease (1)  G3a                   45-59                Mild to moderate decrease  G3b                   30-44                Moderate to severe decrease  G4                    15-29                Severe decrease  G5                    14 or less           Kidney failure          (1)In the absence of evidence of kidney disease, neither GFR category G1 or G2 fulfill the criteria for CKD.    eGFR calculation 2021 CKD-EPI creatinine equation, which does not include  race as a factor    Magnesium [[id number]]  (Normal) Collected: 02/04/25 1932    Specimen: Blood Updated: 02/04/25 1955     Magnesium 2.0 mg/dL     Lipase [[id number]]  (Normal) Collected: 02/04/25 1932    Specimen: Blood Updated: 02/04/25 1955     Lipase 32 U/L     Pregnancy, Urine - Urine, Clean Catch [[id number]]  (Normal) Collected: 02/04/25 1940    Specimen: Urine, Clean Catch Updated: 02/04/25 1946     HCG, Urine QL Negative    Urinalysis With Microscopic If Indicated (No Culture) - Urine, Clean Catch [[id number]]  (Abnormal) Collected: 02/04/25 1940    Specimen: Urine, Clean Catch Updated: 02/04/25 1945     Color, UA Yellow     Appearance, UA Cloudy     pH, UA 6.0     Specific Gravity, UA 1.017     Glucose, UA Negative     Ketones, UA 80 mg/dL (3+)     Bilirubin, UA Negative     Blood, UA Negative     Protein, UA Negative     Leuk Esterase, UA Negative     Nitrite, UA Negative     Urobilinogen, UA 1.0 E.U./dL    Narrative:      Urine microscopic not indicated.    CBC & Differential [[id number]]  (Abnormal) Collected: 02/04/25 1932    Specimen: Blood Updated: 02/04/25 1938    Narrative:      The following orders were created for panel order CBC & Differential.  Procedure                               Abnormality         Status                     ---------                               -----------         ------                     CBC Auto Differential[[id number]]        Abnormal            Final result                 Please view results for these tests on the individual orders.    CBC Auto Differential [[id number]]  (Abnormal) Collected: 02/04/25 1932    Specimen: Blood Updated: 02/04/25 1938     WBC 17.43 10*3/mm3      RBC 4.61 10*6/mm3      Hemoglobin 14.0 g/dL      Hematocrit 41.3 %      MCV 89.6 fL      MCH 30.4 pg      MCHC 33.9 g/dL      RDW 11.9 %      RDW-SD 38.9 fl      MPV 9.8 fL      Platelets 279 10*3/mm3      Neutrophil % 83.1 %      Lymphocyte % 11.4 %      Monocyte % 4.5 %      Eosinophil % 0.3 %       Basophil % 0.2 %      Immature Grans % 0.5 %      Neutrophils, Absolute 14.50 10*3/mm3      Lymphocytes, Absolute 1.98 10*3/mm3      Monocytes, Absolute 0.78 10*3/mm3      Eosinophils, Absolute 0.05 10*3/mm3      Basophils, Absolute 0.03 10*3/mm3      Immature Grans, Absolute 0.09 10*3/mm3      nRBC 0.0 /100 WBC                             ASSESSMENT/PLAN:      Acute appendicitis with localized peritonitis, without perforation, abscess, or gangrene    Ms. Harmon is a 29-year-old female patient, who presented this evening with signs and symptoms suggestive of acute appendicitis.  CT imaging has confirmed the diagnosis, and I have recommended to the patient that we proceed with a laparoscopic appendectomy for definitive management purposes.  I explained the surgical procedure in detail, and discussed with her the risks, benefits, and alternatives to the procedure.  We discussed the risk of bleeding, infection, conversion to an open procedure, the need for additional procedures, potential need for postoperative hospital admission, and the risks related to anesthesia.  She understands, and is willing to proceed as discussed.  I am hopeful that she will be able to be discharged home with her family postoperatively.  At the conclusion of our discussion, she had no additional questions, and provided her informed consent to proceed with the operative procedure as recommended.      Noemí Donovan MD  02/04/25  21:54 EST

## 2025-02-05 NOTE — H&P (VIEW-ONLY)
General Surgery Consult     Name:Rose Harmon  Age: 29 y.o.  Gender: female  : 1995  MRN: [id number]  Visit Number: [id number]  Admit Date: 2025  Date of Service: 25    Patient Care Team:  Tess Doll MD as PCP - General (Family Medicine)    Reason for Consultation: Acute appendicitis    Chief complaint : abdominal pain, nausea, dry heaves      History of Present Illness:     Rose Harmon is a 29 y.o. female patient who presented to the emergency department this evening complaining of a several hour history of the acute onset of lower abdominal pain with associated nausea, dry heaves, and chills.  She reports that when she awoke this morning she was in her usual state of health.  She denies fevers.  She denies vomiting, or diarrhea.  She denies any urinary symptoms.  In the emergency department, She was noted to be afebrile, but was tachycardic to 119.    Labs revealed an elevated white blood cell count of 17.43 with a noted left shift at 83% neutrophils.  Comprehensive metabolic panel was unremarkable.  Urinalysis demonstrated 3+ ketones.  Urin pregnancy test was negative.  She went on to have a CT scan of the abdomen pelvis performed with the benefit of IV contrast.  This demonstratede wall thickening of an otherwise normal caliber appendix with mild stranding adjacent to the appendix, likely representing early acute appendicitis.  No other intra-abdominal pathology was identified.  Based on these findings, I was contacted by the emergency department provider, and asked to evaluate the patient in consultation.    Patient Active Problem List   Diagnosis    Bipolar 1 disorder, manic, moderate    Generalized anxiety disorder    Acute appendicitis with localized peritonitis, without perforation, abscess, or gangrene       History reviewed. No pertinent past medical history.    Past Surgical History:   Procedure Laterality Date    TONSILLECTOMY         Family History   Problem Relation Age  of Onset    Diabetes type II Mother     Gallbladder disease Father     Diabetes type II Maternal Grandmother        Social History     Socioeconomic History    Marital status: Single   Tobacco Use    Smoking status: Never     Passive exposure: Never    Smokeless tobacco: Never   Substance and Sexual Activity    Alcohol use: Never    Drug use: Never    Sexual activity: Defer         Current Facility-Administered Medications:     heparin (porcine) 5000 UNIT/ML injection 5,000 Units, 5,000 Units, Subcutaneous, Once, Noemí Donovan MD    lactated ringers infusion, 75 mL/hr, Intravenous, Continuous, Nathan Dooley MD, Last Rate: 75 mL/hr at 02/04/25 2126, 75 mL/hr at 02/04/25 2126    piperacillin-tazobactam (ZOSYN) IVPB 3.375 g IVPB in 100 mL NS (VTB), 3.375 g, Intravenous, Once, Nathan Dooley MD, 3.375 g at 02/04/25 2127    [COMPLETED] Insert Peripheral IV, , , Once **AND** sodium chloride 0.9 % flush 10 mL, 10 mL, Intravenous, PRN, Nathan Dooley MD    sodium chloride 0.9 % flush 10 mL, 10 mL, Intravenous, Q12H, Noemí Donovan MD    sodium chloride 0.9 % flush 10 mL, 10 mL, Intravenous, PRN, Noemí Donovan MD    sodium chloride 0.9 % infusion 40 mL, 40 mL, Intravenous, PRN, Noemí Donovan MD  No current outpatient medications on file.    (Not in a hospital admission)      No Known Allergies    Review of Systems   Constitutional:  Positive for chills.   HENT: Negative.     Eyes: Negative.    Respiratory: Negative.     Cardiovascular: Negative.    Gastrointestinal:  Positive for abdominal distention, abdominal pain and nausea.   Endocrine: Negative.    Genitourinary: Negative.    Musculoskeletal: Negative.    Skin: Negative.    Allergic/Immunologic: Negative.    Neurological: Negative.    Hematological: Negative.    Psychiatric/Behavioral: Negative.         OBJECTIVE:     Vital Signs  Temp:  [98 °F (36.7 °C)] 98 °F (36.7 °C)  Heart Rate:  [119] 119  Resp:  [18] 18  BP:  ()/(73-96) 93/75    No intake/output data recorded.  No intake/output data recorded.      Physical Exam:      General Appearance:    Alert, cooperative, in no acute distress   Head:    Normocephalic, without obvious abnormality, atraumatic   Eyes:            Lids and lashes normal, conjunctivae and sclerae normal, no icterus   Ears:    Ears appear intact with no abnormalities noted   Lungs:     Respirations regular, even and unlabored    Heart:    Regular rhythm and normal rate   Abdomen:     Soft, non-tender, non-distended, no guarding, no rebound   tenderness   Genitalia:    Deferred   Extremities:   Moves all extremities well, no edema, no cyanosis, no  redness   Pulses:   Pulses palpable and equal bilaterally   Skin:   No bleeding, bruising or rash   Neurologic: Awake and alert, no facial droop, no gross deficits         Results Review:   I have reviewed the entirety of the patient's clinical lab results.  I have also personally reviewed the patient's imaging    Narrative & Impression   FINAL REPORT     TECHNIQUE:  null     CLINICAL HISTORY:  RLQ pain     COMPARISON:  null     FINDINGS:  CT abdomen and pelvis with contrast     Comparison: None     Findings:     The appendix normal in caliber at 9.5 mm.     However, there is borderline wall thickening at 3 to 4 mm.     Minimal stranding medial to the appendiceal origin.     Findings may represent early or mild acute appendicitis     Lung bases are clear. No acute bony abnormalities.     Liver and spleen within normal limits.     Pancreas and adrenal glands unremarkable.     Gallbladder is within normal limits.     No significant focal renal abnormalities.     Renal cysts, no stones or hydronephrosis.     Abdominal aorta is normal in caliber.     No free fluid or adenopathy in the pelvis.     No diverticulitis.     Uterus normal size. No adnexal abnormality.     IMPRESSION:  Impression:     Borderline abnormal appendix as above     Question developing early  or mild appendicitis     Authenticated and Electronically Signed by Demetrius Hinkle MD on  02/04/2025 09:05:37 PM     Lab Results (last 72 hours)       Procedure Component Value Units Date/Time    COVID-19 and FLU A/B PCR, 1 HR TAT - Swab, Nasopharynx [[id number]]  (Normal) Collected: 02/04/25 1932    Specimen: Swab from Nasopharynx Updated: 02/04/25 2012     COVID19 Not Detected     Influenza A PCR Not Detected     Influenza B PCR Not Detected    Narrative:      Fact sheet for providers: https://www.fda.gov/media/347361/download    Fact sheet for patients: https://www.fda.gov/media/574059/download    Test performed by PCR.    Comprehensive Metabolic Panel [[id number]]  (Abnormal) Collected: 02/04/25 1932    Specimen: Blood Updated: 02/04/25 1955     Glucose 106 mg/dL      BUN 11 mg/dL      Creatinine 0.79 mg/dL      Sodium 139 mmol/L      Potassium 3.9 mmol/L      Chloride 101 mmol/L      CO2 25.4 mmol/L      Calcium 9.8 mg/dL      Total Protein 8.3 g/dL      Albumin 5.2 g/dL      ALT (SGPT) 18 U/L      AST (SGOT) 24 U/L      Alkaline Phosphatase 72 U/L      Total Bilirubin 0.5 mg/dL      Globulin 3.1 gm/dL      A/G Ratio 1.7 g/dL      BUN/Creatinine Ratio 13.9     Anion Gap 12.6 mmol/L      eGFR 105.3 mL/min/1.73     Narrative:      GFR Categories in Chronic Kidney Disease (CKD)      GFR Category          GFR (mL/min/1.73)    Interpretation  G1                     90 or greater         Normal or high (1)  G2                      60-89                Mild decrease (1)  G3a                   45-59                Mild to moderate decrease  G3b                   30-44                Moderate to severe decrease  G4                    15-29                Severe decrease  G5                    14 or less           Kidney failure          (1)In the absence of evidence of kidney disease, neither GFR category G1 or G2 fulfill the criteria for CKD.    eGFR calculation 2021 CKD-EPI creatinine equation, which does not include  race as a factor    Magnesium [[id number]]  (Normal) Collected: 02/04/25 1932    Specimen: Blood Updated: 02/04/25 1955     Magnesium 2.0 mg/dL     Lipase [[id number]]  (Normal) Collected: 02/04/25 1932    Specimen: Blood Updated: 02/04/25 1955     Lipase 32 U/L     Pregnancy, Urine - Urine, Clean Catch [[id number]]  (Normal) Collected: 02/04/25 1940    Specimen: Urine, Clean Catch Updated: 02/04/25 1946     HCG, Urine QL Negative    Urinalysis With Microscopic If Indicated (No Culture) - Urine, Clean Catch [[id number]]  (Abnormal) Collected: 02/04/25 1940    Specimen: Urine, Clean Catch Updated: 02/04/25 1945     Color, UA Yellow     Appearance, UA Cloudy     pH, UA 6.0     Specific Gravity, UA 1.017     Glucose, UA Negative     Ketones, UA 80 mg/dL (3+)     Bilirubin, UA Negative     Blood, UA Negative     Protein, UA Negative     Leuk Esterase, UA Negative     Nitrite, UA Negative     Urobilinogen, UA 1.0 E.U./dL    Narrative:      Urine microscopic not indicated.    CBC & Differential [[id number]]  (Abnormal) Collected: 02/04/25 1932    Specimen: Blood Updated: 02/04/25 1938    Narrative:      The following orders were created for panel order CBC & Differential.  Procedure                               Abnormality         Status                     ---------                               -----------         ------                     CBC Auto Differential[[id number]]        Abnormal            Final result                 Please view results for these tests on the individual orders.    CBC Auto Differential [[id number]]  (Abnormal) Collected: 02/04/25 1932    Specimen: Blood Updated: 02/04/25 1938     WBC 17.43 10*3/mm3      RBC 4.61 10*6/mm3      Hemoglobin 14.0 g/dL      Hematocrit 41.3 %      MCV 89.6 fL      MCH 30.4 pg      MCHC 33.9 g/dL      RDW 11.9 %      RDW-SD 38.9 fl      MPV 9.8 fL      Platelets 279 10*3/mm3      Neutrophil % 83.1 %      Lymphocyte % 11.4 %      Monocyte % 4.5 %      Eosinophil % 0.3 %       Basophil % 0.2 %      Immature Grans % 0.5 %      Neutrophils, Absolute 14.50 10*3/mm3      Lymphocytes, Absolute 1.98 10*3/mm3      Monocytes, Absolute 0.78 10*3/mm3      Eosinophils, Absolute 0.05 10*3/mm3      Basophils, Absolute 0.03 10*3/mm3      Immature Grans, Absolute 0.09 10*3/mm3      nRBC 0.0 /100 WBC                             ASSESSMENT/PLAN:      Acute appendicitis with localized peritonitis, without perforation, abscess, or gangrene    Ms. Harmon is a 29-year-old female patient, who presented this evening with signs and symptoms suggestive of acute appendicitis.  CT imaging has confirmed the diagnosis, and I have recommended to the patient that we proceed with a laparoscopic appendectomy for definitive management purposes.  I explained the surgical procedure in detail, and discussed with her the risks, benefits, and alternatives to the procedure.  We discussed the risk of bleeding, infection, conversion to an open procedure, the need for additional procedures, potential need for postoperative hospital admission, and the risks related to anesthesia.  She understands, and is willing to proceed as discussed.  I am hopeful that she will be able to be discharged home with her family postoperatively.  At the conclusion of our discussion, she had no additional questions, and provided her informed consent to proceed with the operative procedure as recommended.      Noemí Donovan MD  02/04/25  21:54 EST

## 2025-02-05 NOTE — ANESTHESIA POSTPROCEDURE EVALUATION
Patient: Rose Harmon    Procedure Summary       Date: 02/04/25 Room / Location: [facility] OR 2 /  [facility] OR    Anesthesia Start: 2216 Anesthesia Stop: 2345    Procedure: APPENDECTOMY LAPAROSCOPIC (Abdomen) Diagnosis:       Appendicitis      (appendicitis)    Surgeons: Noemí Donovan MD Provider: Torin Escobedo CRNA    Anesthesia Type: general ASA Status: 1 - Emergent            Anesthesia Type: general    Vitals  No vitals data found for the desired time range.          Post Anesthesia Care and Evaluation    Patient location during evaluation: PACU  Patient participation: complete - patient participated  Level of consciousness: awake and alert  Pain score: 2  Pain management: satisfactory to patient    Airway patency: patent  Anesthetic complications: No anesthetic complications  PONV Status: none  Cardiovascular status: acceptable and stable  Respiratory status: acceptable  Hydration status: acceptable    Comments: Vitals signs as noted in nursing documentation as per protocol.

## 2025-02-06 LAB — REF LAB TEST METHOD: NORMAL

## 2025-02-06 RX ORDER — HEPARIN SODIUM 1000 [USP'U]/ML
INJECTION, SOLUTION INTRAVENOUS; SUBCUTANEOUS AS NEEDED
Status: DISCONTINUED | OUTPATIENT
Start: 2025-02-04 | End: 2025-02-06 | Stop reason: HOSPADM

## 2025-02-07 NOTE — PROGRESS NOTES
Subjective   Rose Harmon is a 29 y.o. female.   Chief Complaint   Patient presents with    Post-op Follow-up     Lap appy        History of Present Illness The patient is in the office today for a post up visit following a laparoscopic cholecystectomy that was performed on 02/04/2025. The patient presented to the ED on 02/04/2025 complaining of acte lower abdominal pain, nausea, dry heaves, and chills. A CT of the abdomen and pelvis was performed that revealed early or mild acute appendicitis.     The pathology report was received and confirmed acute appendicitis. The patient states thye have done fine and have no complaints at this time.      The following portions of the patient's history were reviewed and updated as appropriate: allergies, current medications, past family history, past medical history, past social history, past surgical history, and problem list.    Review of Systems   Constitutional:  Negative for chills, fever and unexpected weight loss.   HENT:  Negative for hearing loss, trouble swallowing and voice change.    Eyes:  Negative for visual disturbance.   Respiratory:  Negative for apnea, cough, chest tightness, shortness of breath and wheezing.    Cardiovascular:  Negative for chest pain, palpitations and leg swelling.   Gastrointestinal:  Negative for abdominal distention, abdominal pain, anal bleeding, blood in stool, constipation, diarrhea, nausea, rectal pain, vomiting, GERD and indigestion.   Endocrine: Negative for cold intolerance and heat intolerance.   Genitourinary:  Negative for difficulty urinating, dysuria and flank pain.   Musculoskeletal:  Negative for back pain and gait problem.   Skin:  Negative for color change, rash, skin lesions and wound.   Neurological:  Negative for dizziness, syncope, speech difficulty, weakness, light-headedness and numbness.   Hematological:  Negative for adenopathy. Does not bruise/bleed easily.   Psychiatric/Behavioral:  Negative for depressed  mood. The patient is not nervous/anxious.        Objective   Physical Exam      Assessment & Plan   Diagnoses and all orders for this visit:    1. S/P laparoscopic appendectomy (Primary)

## 2025-02-13 ENCOUNTER — OFFICE VISIT (OUTPATIENT)
Dept: SURGERY | Facility: CLINIC | Age: 30
End: 2025-02-13
Payer: COMMERCIAL

## 2025-02-13 VITALS
HEART RATE: 67 BPM | DIASTOLIC BLOOD PRESSURE: 68 MMHG | BODY MASS INDEX: 22.56 KG/M2 | HEIGHT: 62 IN | TEMPERATURE: 97.5 F | SYSTOLIC BLOOD PRESSURE: 100 MMHG | WEIGHT: 122.6 LBS | OXYGEN SATURATION: 100 %

## 2025-02-13 DIAGNOSIS — Z90.49 S/P LAPAROSCOPIC APPENDECTOMY: Primary | ICD-10-CM

## 2025-02-13 PROBLEM — K35.30 ACUTE APPENDICITIS WITH LOCALIZED PERITONITIS, WITHOUT PERFORATION, ABSCESS, OR GANGRENE: Status: RESOLVED | Noted: 2025-02-04 | Resolved: 2025-02-13

## 2025-02-13 PROCEDURE — 99024 POSTOP FOLLOW-UP VISIT: CPT | Performed by: SURGERY

## 2025-02-21 NOTE — OP NOTE
Operative Report    Patient Name:  Rose Harmon  YOB: 1995    Date of Surgery:  2/4/2025     Pre-op Diagnosis: Acute appendicitis with localized peritonitis without perforation, abscess, or gangrene      Post-Op Diagnosis: Same    Procedure(s):  LAPAROSCOPIC APPENDECTOMY         Staff:  Surgeon(s):  Noemí Donovan MD         Anesthesia: Choice    Estimated Blood Loss: 5 mL    Implants:    Implant Name Type Inv. Item Serial No.  Lot No. LRB No. Used Action   RELOAD ECHELON ENDOPATH GST 45MM DALE - [id number] Implant RELOAD ECHELON ENDOPATH GST 45MM DALE  [employer]  DIV OF J AND J [id number] N/A 1 Implanted   RELOAD ECHELON ENDOPATH GST 45MM WHT - [id number] Implant RELOAD ECHELON ENDOPATH GST 45MM WHT  [employer]  DIV OF J AND J [id number] N/A 2 Implanted   CLIPAPPLR M/ ENDO LIGACLIP ROT 10MM MD/LG - [id number] Implant CLIPAPPLR M/ ENDO LIGACLIP ROT 10MM MD/LG  [employer]  DIV OF J AND J [id number] N/A 1 Implanted       Specimen:          Specimens       ID Source Type Tests Collected By Collected At Frozen?    A Large Intestine, Appendix Tissue TISSUE EXAM, [facility] ([facility])   Yannick Li, RN 2/4/25 2874 No              Complications: None    Indications:  Ms. Harmon is a 29-year-old female patient who presented to the emergency department earlier this evening with the acute onset of abdominal pain, nausea, dry heaves, and chills.  She was noted to have associated tachycardia, elevation of her white blood cell count with a left shift, and ultimately was found to be tender in the right lower quadrant.  It was felt that she had a clinical presentation consistent with that of appendicitis, and CT imaging subsequently confirmed the diagnosis demonstrating wall thickening of a normal caliber appendix with adjacent fat stranding representing early acute appendicitis without perforation.  Surgical consultation was completed in the emergency department.  These findings are dictated in my  initial surgical consultation note.  Laparoscopic appendectomy was recommended for definitive management and the patient was counseled accordingly regarding the same.  She is agreeable to proceed with surgery and is now being brought to the operating room for the surgical procedure as discussed.  She has provided informed consent.    Description of Procedure:  The patient was seen and examined in the preoperative holding area on the day of surgery and there are no changes to the medical history as documented in the surgical consultation note.  The patient was then taken to the operating room and placed supine on the operating table where a timeout was performed using the WHO checklist.  The patient received appropriate preoperative antibiotics as well as subcutaneous heparin for DVT prophylaxis.    Following the satisfactory induction of general anesthesia, a [place] catheter was inserted for decompression of the patient's bladder.  The patient's abdomen was then prepped and draped in the usual sterile fashion.  A vertical incision was made just above the umbilicus and was carried through the soft tissue to the level of the fascia.  The fascia was grasped and elevated between Kocher clamps and incised sharply with a knife.  Entry was confirmed into the peritoneum visually and there was no bowel visible in the vicinity of this incision.  Two stay sutures of 0 Vicryl were placed in either side of the fascia and a Israel cannula was inserted under direct visualization.  The sutures were used to secure the cannula.    The abdomen was insufflated with carbon dioxide to a pressure of 15 mmHg and the laparoscope was introduced.  The abdomen was inspected and no injuries were noted from initial trocar placement.  Following this, 2 additional 5 mm ports were placed in the left lateral and suprapubic positions under direct visualization.  The patient was placed into the Trendelenburg position with the left side down and the  appendix was identified in the right lower quadrant.  It was noted to be acutely inflamed.  The mesoappendix was grasped and used to retract the appendix anteriorly towards the abdominal wall.  A [place] dissector was used to make a window in the avascular plane between the appendix and the mesoappendix.  Once this window was created, the linear cutting stapler was introduced through the umbilical port and a single fire of the stapler was used to transect the appendix at its junction with the cecum.  An additional fire of the linear cutting stapler was used to transect the mesoappendix.  The staple lines were inspected and some ongoing oozing was seen on the staple line of the mesoappendix.  This was controlled with application of laparoscopic clips.  The area was reinspected and hemostasis was found to be acceptable.  The appendix was then placed into an Endo Catch bag.  The operative field was then irrigated and inspected for hemostasis.  This was found to be excellent.  A brief survey of the remainder of the peritoneal cavity revealed no additional abnormalities. The 5mm abdominal trocars were removed under direct visualization and no bleeding was noted.  The laparoscope was withdrawn and the abdomen was desufflated.  The Israel cannula was removed out of the umbilical port site followed by the appendix which was completely contained within the Endo Catch bag.  This was passed off the field as specimen.  The fascia of the umbilical port site was then closed using a 0 Vicryl suture placed in a figure-of-eight fashion ×2.  The skin of all incisions was closed using a 4-0 Vicryl suture placed in a subcuticular fashion.  0.25% Marcaine was injected into the wounds for postoperative analgesia.  Mastisol and Steri-Strips were applied to the wounds and covered with dry sterile dressings.    There were no immediate complications noted and the procedure was well tolerated by the patient.  All sponge, needle,  and  instrument  counts were correct at the conclusion of procedure.  Dr. Donovan was present scrubbed for the procedure in its entirety.  The patient was awakened from anesthesia and taken to the recovery room in good condition.             Noemí Donovan MD

## (undated) DEVICE — BNDG ADHS PLSTC 1X3IN LF

## (undated) DEVICE — ADHS LIQ MASTISOL 2/3ML

## (undated) DEVICE — DRSNG SURESITE WNDW 4X4.5

## (undated) DEVICE — GLV SURG SENSICARE GREEN W/ALOE PF LF 6 STRL

## (undated) DEVICE — SLV SCD CALF HEMOFORCE DVT THERP REPROC MD

## (undated) DEVICE — SOL IRR NACL 0.9PCT BO 1000ML

## (undated) DEVICE — SOL NACL 0.9PCT 1000ML

## (undated) DEVICE — 1LYRTR 16FR10ML100%SIL UMS SNP: Brand: MEDLINE INDUSTRIES, INC.

## (undated) DEVICE — PATIENT RETURN ELECTRODE, SINGLE-USE, CONTACT QUALITY MONITORING, ADULT, WITH 9FT CORD, FOR PATIENTS WEIGING OVER 33LBS. (15KG): Brand: MEGADYNE

## (undated) DEVICE — GAUZE,SPONGE,4"X4",12PLY,STERILE,LF,2'S: Brand: MEDLINE

## (undated) DEVICE — UNDYED MONOFILAMENT (POLYDIOXANONE), ABSORBABLE SYNTHETIC SURGICAL SUTURE: Brand: PDS

## (undated) DEVICE — SUT VIC 0/0 UR6 27IN DYED J603H

## (undated) DEVICE — ST TBG PNEUMOCLEAR EVAC SMOKE HIFLO

## (undated) DEVICE — THE ECHELON FLEX POWERED PLUS ARTICULATING ENDOSCOPIC LINEAR CUTTERS ARE STERILE, SINGLE PATIENT USE INSTRUMENTS THAT SIMULTANEOUSLYCUT AND STAPLE TISSUE. THERE ARE SIX STAGGERED ROWS OF STAPLES, THREE ON EITHER SIDE OF THE CUT LINE. THE ECHELON FLEX 45 POWERED PLUSINSTRUMENTS HAVE A STAPLE LINE THAT IS APPROXIMATELY 45 MM LONG AND A CUT LINE THAT IS APPROXIMATELY 42 MM LONG. THE SHAFT CAN ROTATE FREELYIN BOTH DIRECTIONS AND AN ARTICULATION MECHANISM ENABLES THE DISTAL PORTION OF THE SHAFT TO PIVOT TO FACILITATE LATERAL ACCESS TO THE OPERATIVESITE.THE INSTRUMENTS ARE PACKAGED WITH A PRIMARY LITHIUM BATTERY PACK THAT MUST BE INSTALLED PRIOR TO USE. THERE ARE SPECIFIC REQUIREMENTS FORDISPOSING OF THE BATTERY PACK. REFER TO THE BATTERY PACK DISPOSAL SECTION.THE INSTRUMENTS ARE PACKAGED WITHOUT A RELOAD AND MUST BE LOADED PRIOR TO USE. A STAPLE RETAINING CAP ON THE RELOAD PROTECTS THE STAPLE LEGPOINTS DURING SHIPPING AND TRANSPORTATION. THE INSTRUMENTS’ LOCK-OUT FEATURE IS DESIGNED TO PREVENT A USED OR IMPROPERLY INSTALLED RELOADFROM BEING REFIRED OR AN INSTRUMENT FROM BEING FIRED WITHOUT A RELOAD.: Brand: ECHELON FLEX

## (undated) DEVICE — ENDOPATH XCEL BLUNT TIP TROCARS WITH SMOOTH SLEEVES: Brand: ENDOPATH XCEL

## (undated) DEVICE — GLV SURG ULTRATOUCH BIOGEL/COAT PF LF SZ6 STRL

## (undated) DEVICE — 2, DISPOSABLE SUCTION/IRRIGATOR WITHOUT DISPOSABLE TIP: Brand: STRYKEFLOW

## (undated) DEVICE — ENDOPOUCH RETRIEVER SPECIMEN RETRIEVAL BAGS: Brand: ENDOPOUCH RETRIEVER

## (undated) DEVICE — NDL HYPO ECLPS SFTY 22G 1 1/2IN

## (undated) DEVICE — RICH GENERAL LAPAROSCOPY: Brand: MEDLINE INDUSTRIES, INC.

## (undated) DEVICE — SYR LL TP 10ML STRL

## (undated) DEVICE — ENDOPATH XCEL UNIVERSAL TROCAR STABLILITY SLEEVES: Brand: ENDOPATH XCEL

## (undated) DEVICE — ENDOPATH XCEL BLADELESS TROCARS WITH STABILITY SLEEVES: Brand: ENDOPATH XCEL